# Patient Record
Sex: FEMALE | Race: WHITE | NOT HISPANIC OR LATINO | Employment: OTHER | URBAN - METROPOLITAN AREA
[De-identification: names, ages, dates, MRNs, and addresses within clinical notes are randomized per-mention and may not be internally consistent; named-entity substitution may affect disease eponyms.]

---

## 2021-01-08 ENCOUNTER — OFFICE VISIT (OUTPATIENT)
Dept: URGENT CARE | Facility: URGENT CARE | Age: 81
End: 2021-01-08
Payer: MEDICARE

## 2021-01-08 VITALS
SYSTOLIC BLOOD PRESSURE: 114 MMHG | OXYGEN SATURATION: 98 % | DIASTOLIC BLOOD PRESSURE: 70 MMHG | TEMPERATURE: 97.7 F | HEART RATE: 83 BPM

## 2021-01-08 DIAGNOSIS — Z20.822 EXPOSURE TO COVID-19 VIRUS: Primary | ICD-10-CM

## 2021-01-08 PROCEDURE — U0003 INFECTIOUS AGENT DETECTION BY NUCLEIC ACID (DNA OR RNA); SEVERE ACUTE RESPIRATORY SYNDROME CORONAVIRUS 2 (SARS-COV-2) (CORONAVIRUS DISEASE [COVID-19]), AMPLIFIED PROBE TECHNIQUE, MAKING USE OF HIGH THROUGHPUT TECHNOLOGIES AS DESCRIBED BY CMS-2020-01-R: HCPCS | Performed by: FAMILY MEDICINE

## 2021-01-08 PROCEDURE — 99203 OFFICE O/P NEW LOW 30 MIN: CPT | Performed by: FAMILY MEDICINE

## 2021-01-08 PROCEDURE — U0005 INFEC AGEN DETEC AMPLI PROBE: HCPCS | Performed by: FAMILY MEDICINE

## 2021-01-08 RX ORDER — CALCITRIOL 0.25 UG/1
0.25 CAPSULE, LIQUID FILLED ORAL
COMMUNITY
Start: 2020-11-13

## 2021-01-08 RX ORDER — CITALOPRAM HYDROBROMIDE 10 MG/1
10 TABLET ORAL DAILY
COMMUNITY
Start: 2020-10-18

## 2021-01-08 RX ORDER — LEVOTHYROXINE SODIUM 112 UG/1
112 TABLET ORAL DAILY
COMMUNITY
Start: 2020-11-13

## 2021-01-08 RX ORDER — AMOXICILLIN 500 MG/1
CAPSULE ORAL
COMMUNITY
Start: 2020-12-09

## 2021-01-08 RX ORDER — HYDROXYCHLOROQUINE SULFATE 200 MG/1
300 TABLET, FILM COATED ORAL DAILY
COMMUNITY
Start: 2020-07-28

## 2021-01-08 NOTE — PROGRESS NOTES
Chief complaint: covid exposure    12/27 granddaughter came to visit.   2 days later developed symptoms and turned out to have covid.  Per patient they were about 6-7 feet apart wearing masks the time they visited     Past two days started having a bit of sneezing and postnasal discharge   Just mild symptoms feels like a cold   Also has been a bit tired   Her symptoms are very mild but she would just like to be checked for covid    Allergies   Allergen Reactions     Penicillins Rash     Augmentin Nausea and Vomiting and Nausea       No past medical history on file.         calcitRIOL (ROCALTROL) 0.25 MCG capsule, Take 0.25 mcg by mouth       hydroxychloroquine (PLAQUENIL) 200 MG tablet, 1 tab in morning, 1/2 tab in evening Tues, Thur, Sat and 1 tab in the am and 1 tab in the evening M,W,F,Sun       amoxicillin (AMOXIL) 500 MG capsule, TAKE 4 CAPSULES BY MOUTH 1 HOUR BEFORE DENTAL APPT       citalopram (CELEXA) 10 MG tablet, Take 10 mg by mouth daily       levothyroxine (SYNTHROID/LEVOTHROID) 112 MCG tablet, Take 112 mcg by mouth daily    No current facility-administered medications on file prior to visit.       Social History     Tobacco Use     Smoking status: Not on file   Substance Use Topics     Alcohol use: Not on file     Drug use: Not on file       ROS:  review of systems negative except for noted above.       OBJECTIVE:  /70   Pulse 83   Temp 97.7  F (36.5  C) (Tympanic)   SpO2 98%    General:   awake, alert, and cooperative.  NAD.   Head: Normocephalic, atraumatic.  Eyes: Conjunctiva clear,   ENT: midline nasal septum no congestion. Bilateral TYMPANINC MEMBRANE normal   Mouth: moist buccal mucosa nonhyperemic posterior pharyngeal wall tonsils not enlarged  Neck: supple no cervical lymphadenopathy  Heart: Regular rate and rhythm. No murmur.  Lungs: Chest is clear; no wheezes or rales.   Neuro: Alert and oriented - normal speech.  MS: Using extremities freely  PSYCH:  Normal affect, normal  speech  SKIN: no obvious rashes    ASSESSMENT:    ICD-10-CM    1. Exposure to COVID-19 virus  Z20.822 Symptomatic COVID-19 Virus (Coronavirus) by PCR       PLAN:   Supportive treatment   Symptoms are mild. Suspect viral  Rule out covid  covid19 precautions advised  Patient has no signs or symptoms of pneumonia  Lungs are clear  Vital signs stable.  Alarm signs or symptoms discussed, if present recommend go to ER         Loraine Velasco MD

## 2021-01-09 LAB
SARS-COV-2 RNA RESP QL NAA+PROBE: NOT DETECTED
SPECIMEN SOURCE: NORMAL

## 2022-06-28 ENCOUNTER — OFFICE VISIT (OUTPATIENT)
Dept: URGENT CARE | Facility: URGENT CARE | Age: 82
End: 2022-06-28
Payer: MEDICARE

## 2022-06-28 VITALS
RESPIRATION RATE: 10 BRPM | HEART RATE: 58 BPM | DIASTOLIC BLOOD PRESSURE: 62 MMHG | SYSTOLIC BLOOD PRESSURE: 111 MMHG | TEMPERATURE: 98.2 F | OXYGEN SATURATION: 100 %

## 2022-06-28 DIAGNOSIS — R30.0 DYSURIA: Primary | ICD-10-CM

## 2022-06-28 DIAGNOSIS — N30.01 ACUTE CYSTITIS WITH HEMATURIA: ICD-10-CM

## 2022-06-28 LAB
ALBUMIN UR-MCNC: NEGATIVE MG/DL
APPEARANCE UR: CLEAR
BACTERIA #/AREA URNS HPF: ABNORMAL /HPF
BILIRUB UR QL STRIP: NEGATIVE
COLOR UR AUTO: YELLOW
GLUCOSE UR STRIP-MCNC: NEGATIVE MG/DL
HGB UR QL STRIP: NEGATIVE
KETONES UR STRIP-MCNC: NEGATIVE MG/DL
LEUKOCYTE ESTERASE UR QL STRIP: ABNORMAL
NITRATE UR QL: NEGATIVE
PH UR STRIP: 5.5 [PH] (ref 5–7)
RBC #/AREA URNS AUTO: ABNORMAL /HPF
SP GR UR STRIP: >=1.03 (ref 1–1.03)
UROBILINOGEN UR STRIP-ACNC: 0.2 E.U./DL
WBC #/AREA URNS AUTO: ABNORMAL /HPF
WBC CLUMPS #/AREA URNS HPF: PRESENT /HPF

## 2022-06-28 PROCEDURE — 81001 URINALYSIS AUTO W/SCOPE: CPT | Performed by: PHYSICIAN ASSISTANT

## 2022-06-28 PROCEDURE — 99213 OFFICE O/P EST LOW 20 MIN: CPT | Performed by: PHYSICIAN ASSISTANT

## 2022-06-28 RX ORDER — LEVOTHYROXINE SODIUM 125 UG/1
TABLET ORAL
COMMUNITY
Start: 2021-07-16

## 2022-06-28 RX ORDER — SULFAMETHOXAZOLE/TRIMETHOPRIM 800-160 MG
1 TABLET ORAL 2 TIMES DAILY
Qty: 10 TABLET | Refills: 0 | Status: SHIPPED | OUTPATIENT
Start: 2022-06-28 | End: 2022-07-03

## 2022-06-28 NOTE — PROGRESS NOTES
SUBJECTIVE:   Nuvia Soriano is a 81 year old female presenting with a chief complaint of   Chief Complaint   Patient presents with     UTI     Burning since last night        She is a new patient of [facility].        Review of Systems    History reviewed. No pertinent past medical history.  History reviewed. No pertinent family history.  Current Outpatient Medications   Medication Sig Dispense Refill     calcitRIOL (ROCALTROL) 0.25 MCG capsule Take 0.25 mcg by mouth       citalopram (CELEXA) 10 MG tablet Take 10 mg by mouth daily       hydroxychloroquine (PLAQUENIL) 200 MG tablet 1 tab in morning, 1/2 tab in evening Tues, Thur, Sat and 1 tab in the am and 1 tab in the evening M,W,F,Sun       levothyroxine (SYNTHROID/LEVOTHROID) 112 MCG tablet Take 112 mcg by mouth daily       levothyroxine (SYNTHROID/LEVOTHROID) 125 MCG tablet levothyroxine 125 mcg tablet       sulfamethoxazole-trimethoprim (BACTRIM DS) 800-160 MG tablet Take 1 tablet by mouth 2 times daily for 5 days 10 tablet 0     amoxicillin (AMOXIL) 500 MG capsule TAKE 4 CAPSULES BY MOUTH 1 HOUR BEFORE DENTAL APPT (Patient not taking: Reported on 6/28/2022)       Social History     Tobacco Use     Smoking status: Former Smoker     Smokeless tobacco: Never Used   Substance Use Topics     Alcohol use: Not on file       OBJECTIVE  /62   Pulse 58   Temp 98.2  F (36.8  C) (Oral)   Resp 10   SpO2 100%     Physical Exam  Vitals and nursing note reviewed.   Constitutional:       Appearance: Normal appearance. She is normal weight.   Eyes:      Extraocular Movements: Extraocular movements intact.      Conjunctiva/sclera: Conjunctivae normal.   Cardiovascular:      Rate and Rhythm: Normal rate and regular rhythm.      Pulses: Normal pulses.      Heart sounds: Normal heart sounds.   Pulmonary:      Effort: Pulmonary effort is normal.      Breath sounds: Normal breath sounds.   Abdominal:      Tenderness: There is no right CVA tenderness or left CVA tenderness.    Skin:     General: Skin is warm and dry.   Neurological:      General: No focal deficit present.      Mental Status: She is alert.   Psychiatric:         Mood and Affect: Mood normal.         Behavior: Behavior normal.         Labs:  Results for orders placed or performed in visit on 06/28/22 (from the past 24 hour(s))   UA Macro with Reflex to Micro and Culture - lab collect    Specimen: Urine, Midstream   Result Value Ref Range    Color Urine Yellow Colorless, Straw, Light Yellow, Yellow    Appearance Urine Clear Clear    Glucose Urine Negative Negative mg/dL    Bilirubin Urine Negative Negative    Ketones Urine Negative Negative mg/dL    Specific Gravity Urine >=1.030 1.003 - 1.035    Blood Urine Negative Negative    pH Urine 5.5 5.0 - 7.0    Protein Albumin Urine Negative Negative mg/dL    Urobilinogen Urine 0.2 0.2, 1.0 E.U./dL    Nitrite Urine Negative Negative    Leukocyte Esterase Urine Small (A) Negative   Urine Microscopic   Result Value Ref Range    Bacteria Urine Moderate (A) None Seen /HPF    RBC Urine 0-2 0-2 /HPF /HPF    WBC Urine 5-10 (A) 0-5 /HPF /HPF    WBC Clumps Urine Present (A) None Seen /HPF    Narrative    Urine Culture not indicated       X-Ray was not done.    ASSESSMENT:      ICD-10-CM    1. Dysuria  R30.0 UA Macro with Reflex to Micro and Culture - lab collect     UA Macro with Reflex to Micro and Culture - lab collect     Urine Microscopic   2. Acute cystitis with hematuria  N30.01 sulfamethoxazole-trimethoprim (BACTRIM DS) 800-160 MG tablet        Medical Decision Making:    Differential Diagnosis:  UTI: UTI, Dysuria, Pyelonephritis, Kidney Stone and Urethritis    Serious Comorbid Conditions:  Adult:  reviewed    PLAN:    Rx for bactrim.  Urine culture pending.  Increase fluid intake. Discussed reasons to seek immediate medical attention - specifically, fevers or vomiting.  Finish all medications.          Followup:    If not improving or if condition worsens, follow up with your  Primary Care Provider, If not improving or if conditions worsens over the next 12-24 hours, go to the Emergency Department    There are no Patient Instructions on file for this visit.

## 2023-10-03 ENCOUNTER — OFFICE VISIT (OUTPATIENT)
Dept: URGENT CARE | Facility: URGENT CARE | Age: 83
End: 2023-10-03
Payer: MEDICARE

## 2023-10-03 VITALS
HEART RATE: 65 BPM | SYSTOLIC BLOOD PRESSURE: 115 MMHG | WEIGHT: 136.2 LBS | TEMPERATURE: 98.1 F | DIASTOLIC BLOOD PRESSURE: 57 MMHG | RESPIRATION RATE: 12 BRPM | OXYGEN SATURATION: 99 %

## 2023-10-03 DIAGNOSIS — S50.311A ABRASION OF SKIN OF RIGHT ELBOW: Primary | ICD-10-CM

## 2023-10-03 PROCEDURE — 12002 RPR S/N/AX/GEN/TRNK2.6-7.5CM: CPT | Mod: RT | Performed by: STUDENT IN AN ORGANIZED HEALTH CARE EDUCATION/TRAINING PROGRAM

## 2023-10-03 NOTE — PROGRESS NOTES
Assessment & Plan     Abrasion of skin of right elbow  83-year-old woman who presents after a mechanical fall this morning onto her right elbow sustaining a skin abrasion and tear.  The patient is not on antiplatelets or anticoagulation, bleeding stopped.  Vitals are within normal limits.  On exam, the patient is nontoxic-appearing, well-hydrated and perfused, with a skin tear measuring 3X 2 cm with no bleeding.  The abrasion was cleaned with Chloraseptic solution and sterile water, skin tear was removed with sterile scissors, and bacitracin and Vaseline was applied with Telfa and bandage.  Discussed wound care and return precautions.  The patient's questions were addressed.             No follow-ups on file.    Dorys Acevedo MD  [facility]     Nuvia is a 83 year old female who presents to clinic today for the following health issues:  Chief Complaint   Patient presents with    Abrasion     Right arm  Fell this morning      HPI    Laceration    Mechanism of injury: walking down the garage steps, missed the second step and fell onto her right elbow sustained abrasion, right hip, denies head injury  History provided by: Patient  Time of injury was 4 hours(s) ago.    This is a non-work related and accidental injury.    Associated symptoms: Denies numbness, weakness, or loss of function  She applied bacitracin  Denies anticoagulation    Last tetanus booster within 10 years: Yes    Review of Systems  Constitutional, HEENT, cardiovascular, pulmonary, gi and gu systems are negative, except as otherwise noted.      Objective    /57   Pulse 65   Temp 98.1  F (36.7  C) (Tympanic)   Resp 12   Wt 61.8 kg (136 lb 3.2 oz)   SpO2 99%   Physical Exam   GENERAL: healthy, alert and no distress, nontoxic  EYES: Eyes grossly normal to inspection, and conjunctivae and sclerae normal  HENT: nose and mouth without ulcers or lesions  RESP: lungs clear to auscultation - no rales,  rhonchi or wheezes  CV: regular rate and rhythm, normal S1 S2, no S3 or S4, no murmur, normal cap refill, and peripheral pulses strong  MS: Full active range of motion of the bilateral upper extremities, sensation to light touch intact  NEURO: No focal neurologic deficits    Abrasion EXAM:   Size of laceration: 3 centimeters  Sensation to light touch intact: Yes  Pulses/capillary refill intact: Yes  Foreign body: No    Picture included in patient's chart: yes        PROCEDURE NOTE:  Anesthesia: None  Prepped and draped in the usual sterile fashion  Wound irrigated with sterile water  Wound was explored for any foreign bodies and evaluated for tendon, nerve, vessel or joint involvement.    Dead skin tear was removed with sterile scissors  Bacitracin and Vaseline was applied  Bandage was applied  Patient tolerated the procedure well    No results found for this or any previous visit (from the past 24 hour(s)).

## 2024-06-01 ENCOUNTER — OFFICE VISIT (OUTPATIENT)
Dept: URGENT CARE | Facility: URGENT CARE | Age: 84
End: 2024-06-01
Payer: MEDICARE

## 2024-06-01 VITALS
SYSTOLIC BLOOD PRESSURE: 124 MMHG | RESPIRATION RATE: 14 BRPM | HEART RATE: 78 BPM | WEIGHT: 135 LBS | DIASTOLIC BLOOD PRESSURE: 65 MMHG | OXYGEN SATURATION: 97 % | TEMPERATURE: 100.7 F

## 2024-06-01 DIAGNOSIS — U07.1 COVID-19 VIRUS INFECTION: Primary | ICD-10-CM

## 2024-06-01 DIAGNOSIS — R05.1 ACUTE COUGH: ICD-10-CM

## 2024-06-01 PROCEDURE — 99203 OFFICE O/P NEW LOW 30 MIN: CPT | Performed by: FAMILY MEDICINE

## 2024-06-01 RX ORDER — BENZONATATE 100 MG/1
100 CAPSULE ORAL 3 TIMES DAILY PRN
Qty: 30 CAPSULE | Refills: 0 | Status: SHIPPED | OUTPATIENT
Start: 2024-06-01

## 2024-06-01 RX ORDER — ALBUTEROL SULFATE 90 UG/1
2 AEROSOL, METERED RESPIRATORY (INHALATION) EVERY 6 HOURS PRN
Qty: 18 G | Refills: 0 | Status: SHIPPED | OUTPATIENT
Start: 2024-06-01

## 2024-06-01 NOTE — PROGRESS NOTES
Assessment & Plan     COVID-19 virus infection    - benzonatate (TESSALON) 100 MG capsule; Take 1 capsule (100 mg) by mouth 3 times daily as needed for cough    Acute cough    - benzonatate (TESSALON) 100 MG capsule; Take 1 capsule (100 mg) by mouth 3 times daily as needed for cough  - albuterol (PROAIR HFA/PROVENTIL HFA/VENTOLIN HFA) 108 (90 Base) MCG/ACT inhaler; Inhale 2 puffs into the lungs every 6 hours as needed for shortness of breath, wheezing or cough    Patient with history of COVID, she has been on Paxlovid, day #5.  She continued to cough, fever.  No chest pain, no short of breath, no wheezing.  She reports last night she has cough mostly dry.  Denies being lightheaded or dizzy.    Advised patient supportive care, increase fluid intake.  Give her a prescription for albuterol, Tessalon, cough medicine.  Continue with Paxlovid.    Discussed with patient if she start having chest pain, short of breath, wheezing, to go to the ER.    Shamika Pedersen is a 83 year old, presenting for the following health issues:  URI (Recent covid- still on Paxlovid /Cough, congestion)  Patient reports she was diagnosed with COVID, she has been on Paxlovid day #5.  She has been coughing, continue to have fever.  No wheezing, no chest pain, no short of breath, no diarrhea no vomiting.  Denies being lightheaded or dizzy,          Review of Systems  Constitutional, HEENT, cardiovascular, pulmonary, GI, , musculoskeletal, neuro, skin, endocrine and psych systems are negative, except as otherwise noted.      Objective    /65   Pulse 78   Temp (!) 100.7  F (38.2  C) (Tympanic)   Resp 14   Wt 61.2 kg (135 lb)   SpO2 97%   There is no height or weight on file to calculate BMI.  Physical Exam   GENERAL: alert and no distress  HENT: ear canals and TM's normal, nose and mouth without ulcers or lesions  NECK: no adenopathy, no asymmetry, masses, or scars  RESP: lungs clear to auscultation - no rales, rhonchi or  wheezes  CV: regular rate and rhythm, normal S1 S2, no S3 or S4, no murmur, click or rub, no peripheral edema  ABDOMEN: soft, nontender, no hepatosplenomegaly, no masses and bowel sounds normal            Signed Electronically by: Jose Armendariz MD

## 2025-05-24 ENCOUNTER — RESULTS FOLLOW-UP (OUTPATIENT)
Dept: URGENT CARE | Facility: URGENT CARE | Age: 85
End: 2025-05-24